# Patient Record
Sex: FEMALE | Race: WHITE | NOT HISPANIC OR LATINO | Employment: FULL TIME | URBAN - METROPOLITAN AREA
[De-identification: names, ages, dates, MRNs, and addresses within clinical notes are randomized per-mention and may not be internally consistent; named-entity substitution may affect disease eponyms.]

---

## 2023-01-30 PROBLEM — Z94.9 TRANSPLANT: Status: ACTIVE | Noted: 2023-01-30

## 2023-01-30 PROBLEM — R94.31 ABNORMAL EKG: Status: ACTIVE | Noted: 2023-01-30

## 2023-01-30 RX ORDER — ESTRADIOL 0.05 MG/D
FILM, EXTENDED RELEASE TRANSDERMAL
COMMUNITY
End: 2023-03-09

## 2023-02-07 PROBLEM — Z52.4 DONOR OF KIDNEY FOR TRANSPLANT: Status: ACTIVE | Noted: 2023-02-07

## 2023-02-23 LAB
APPEARANCE, URINE: CLEAR
BACTERIA, URINE: ABNORMAL /HPF
BILIRUBIN, URINE: NEGATIVE
BLOOD, URINE: NEGATIVE
COLOR, URINE: YELLOW
CREATININE (MG/DL) IN SER/PLAS: 1.16 MG/DL (ref 0.5–1.05)
CREATININE (MG/DL) IN URINE: 107 MG/DL (ref 20–320)
GFR FEMALE: 56 ML/MIN/1.73M2
GLUCOSE, URINE: NEGATIVE MG/DL
KETONES, URINE: NEGATIVE MG/DL
LEUKOCYTE ESTERASE, URINE: ABNORMAL
NITRITE, URINE: NEGATIVE
PH, URINE: 6 (ref 5–8)
PROTEIN (MG/DL) IN URINE: 10 MG/DL (ref 5–24)
PROTEIN, URINE: NEGATIVE MG/DL
PROTEIN/CREATININE (MG/MG) IN URINE: 0.09 MG/MG CREAT (ref 0–0.17)
RBC, URINE: <1 /HPF (ref 0–5)
SPECIFIC GRAVITY, URINE: 1.01 (ref 1–1.03)
SQUAMOUS EPITHELIAL CELLS, URINE: 1 /HPF
UROBILINOGEN, URINE: <2 MG/DL (ref 0–1.9)
WBC, URINE: <1 /HPF (ref 0–5)

## 2023-03-09 ENCOUNTER — TELEPHONE (OUTPATIENT)
Dept: PRIMARY CARE | Facility: CLINIC | Age: 53
End: 2023-03-09

## 2023-03-09 ENCOUNTER — OFFICE VISIT (OUTPATIENT)
Dept: PRIMARY CARE | Facility: CLINIC | Age: 53
End: 2023-03-09
Payer: COMMERCIAL

## 2023-03-09 VITALS
SYSTOLIC BLOOD PRESSURE: 104 MMHG | WEIGHT: 139 LBS | TEMPERATURE: 97 F | OXYGEN SATURATION: 97 % | HEIGHT: 68 IN | RESPIRATION RATE: 16 BRPM | DIASTOLIC BLOOD PRESSURE: 70 MMHG | BODY MASS INDEX: 21.07 KG/M2 | HEART RATE: 73 BPM

## 2023-03-09 DIAGNOSIS — Z00.00 HEALTHCARE MAINTENANCE: Primary | ICD-10-CM

## 2023-03-09 DIAGNOSIS — N39.3 STRESS INCONTINENCE: ICD-10-CM

## 2023-03-09 DIAGNOSIS — Z12.11 COLON CANCER SCREENING: ICD-10-CM

## 2023-03-09 DIAGNOSIS — G47.09 OTHER INSOMNIA: ICD-10-CM

## 2023-03-09 DIAGNOSIS — Z13.220 LIPID SCREENING: ICD-10-CM

## 2023-03-09 DIAGNOSIS — Z78.0 MENOPAUSE: ICD-10-CM

## 2023-03-09 PROBLEM — Z15.02 BIALLELIC MUTATION OF RAD51C GENE: Status: ACTIVE | Noted: 2023-03-09

## 2023-03-09 PROBLEM — Z15.01 BIALLELIC MUTATION OF RAD51C GENE: Status: ACTIVE | Noted: 2023-03-09

## 2023-03-09 PROBLEM — Z15.89 BIALLELIC MUTATION OF RAD51C GENE: Status: ACTIVE | Noted: 2023-03-09

## 2023-03-09 PROBLEM — R92.8 ABNORMAL MAMMOGRAM OF RIGHT BREAST: Status: ACTIVE | Noted: 2023-03-09

## 2023-03-09 PROCEDURE — 99214 OFFICE O/P EST MOD 30 MIN: CPT | Performed by: FAMILY MEDICINE

## 2023-03-09 PROCEDURE — 99396 PREV VISIT EST AGE 40-64: CPT | Performed by: FAMILY MEDICINE

## 2023-03-09 PROCEDURE — 1036F TOBACCO NON-USER: CPT | Performed by: FAMILY MEDICINE

## 2023-03-09 RX ORDER — ESTRADIOL 10 UG/1
10 INSERT VAGINAL 2 TIMES WEEKLY
COMMUNITY
Start: 2023-03-06

## 2023-03-09 RX ORDER — METHYLPREDNISOLONE 4 MG/1
TABLET ORAL
COMMUNITY
Start: 2022-04-06

## 2023-03-09 RX ORDER — CLOBETASOL PROPIONATE 0.46 MG/ML
SOLUTION TOPICAL
COMMUNITY
Start: 2023-02-02

## 2023-03-09 RX ORDER — FLUCONAZOLE 150 MG/1
TABLET ORAL
COMMUNITY
Start: 2023-03-05

## 2023-03-09 RX ORDER — TRAZODONE HYDROCHLORIDE 50 MG/1
50 TABLET ORAL NIGHTLY PRN
Qty: 30 TABLET | Refills: 2 | Status: SHIPPED | OUTPATIENT
Start: 2023-03-09 | End: 2023-08-01 | Stop reason: SDUPTHER

## 2023-03-09 ASSESSMENT — ENCOUNTER SYMPTOMS
NAUSEA: 0
SHORTNESS OF BREATH: 0
ADENOPATHY: 0
ABDOMINAL PAIN: 0
HEMATURIA: 0
WHEEZING: 0
POLYDIPSIA: 0
CHEST TIGHTNESS: 0
PALPITATIONS: 0
LIGHT-HEADEDNESS: 0
CHILLS: 0
DIAPHORESIS: 0
POLYPHAGIA: 0
COUGH: 0
VOMITING: 0
FREQUENCY: 0
NUMBNESS: 0
SORE THROAT: 0
NERVOUS/ANXIOUS: 0
HEADACHES: 0
DIZZINESS: 0
FEVER: 0
DYSURIA: 0
SINUS PAIN: 0
CONFUSION: 0
SINUS PRESSURE: 0
DYSPHORIC MOOD: 0
DIARRHEA: 0
UNEXPECTED WEIGHT CHANGE: 0
CONSTIPATION: 0

## 2023-03-09 ASSESSMENT — PATIENT HEALTH QUESTIONNAIRE - PHQ9
1. LITTLE INTEREST OR PLEASURE IN DOING THINGS: NOT AT ALL
SUM OF ALL RESPONSES TO PHQ9 QUESTIONS 1 AND 2: 0
2. FEELING DOWN, DEPRESSED OR HOPELESS: NOT AT ALL

## 2023-03-09 NOTE — TELEPHONE ENCOUNTER
Left message for pt to let us know what [facility] location she used for her mammogram, need to request results.

## 2023-03-09 NOTE — TELEPHONE ENCOUNTER
----- Message from Jaqueline Frye MD sent at 3/9/2023 12:54 PM EST -----  Please request mammogram result from [facility]

## 2023-03-09 NOTE — ASSESSMENT & PLAN NOTE
- following with [facility] [place]  - negative breast biopsy 12/2022  - going for breast MRI today

## 2023-03-09 NOTE — PROGRESS NOTES
Subjective   Patient ID: Yaron Walker is a 53 y.o. female who presents for well exam.    Had routine screening mammogram 9/15/22 through [facility]. Showed abnormality in the right breast. Had diagnostic mammogram Recommended 6 month follow up. She was not comfortable with that so went to [facility] [place]. They did breast biopsy. Was negative for malignancy. Recommended 6 month follow up mammogram. She is also getting mammograms through there.     Patient is wondering about estrogen therapy. She has been on it since she had her hysterectomy in 2016. Was recently at the breast [place] and they told her to stop it because of increased risk of breast cancer. She has had increased hot flashes since stopping the estrogen. She also is interested in getting the benefits estrogen provides --> decreased risk of osteoporosis, decreased risk of heart disease.     Has been having issues with sleep. Difficult falling asleep. Difficult staying asleep. Has tried melatonin but that did not help. Was on lunesta in the past and that did help.     Concerned about urinary leakage. If she exercises in the morning has no issues but if she goes in the evening, she will have issues. Sometimes just tripping will cause leakage. Has leakage with coughing and sneezing. She has done some pelvic floor training without much help.        Well Adult Physical   Patient here for a comprehensive physical exam.The patient reports  as documented above    Yaron reports her health as Good.    Does the patient take any herbs or supplements that were not prescribed by a doctor? yes   Is the patiet taking calcium supplements? no   Is the patient taking aspirin daily? 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    Menopause 03/09/2023     Resolved Ambulatory Problems     Diagnosis Date Noted    No Resolved Ambulatory Problems     No Additional Past Medical History       Past Surgical History:   Procedure Laterality Date    CT ABDOMEN PELVIS ANGIOGRAM W AND/OR WO IV CONTRAST  09/30/2021    CT ABDOMEN PELVIS ANGIOGRAM W AND/OR WO IV CONTRAST 9/30/2021 [facility]    HYSTERECTOMY      OTHER SURGICAL HISTORY  02/09/2022    Donor kidney transplantation    OTHER SURGICAL HISTORY  10/04/2021    Tumor excision       Outpatient Medications Prior to Visit   Medication Sig Dispense Refill    clobetasol (Temovate) 0.05 % external solution APPLY TOPICALLY TO SCALP EVERY DAY AT BEDTIME.      estradiol (Vagifem) 10 mcg tablet vaginal tablet Insert 1 tablet (10 mcg) into the vagina 2 times a week.      fluconazole (Diflucan) 150 mg tablet TAKE 1 TABLET BY MOUTH TODAY  REPEAT IN 3 DAYS      L. acidophilus/Bifid. animalis 15.5 billion cell capsule Take by mouth.      MAGNESIUM GLYCINATE ORAL Take by mouth.      methylPREDNISolone (Medrol Dospak) 4 mg tablets Take by mouth as directed following package instructions      estradiol (Vivelle-DOT) 0.05 mg/24 hr patch Place on the skin. Twice weekly.       No facility-administered medications prior to visit.       Social History     Socioeconomic History    Marital status:      Spouse name: Rajendra    Number of children: 2    Years of education: None    Highest education level: None   Occupational History    Occupation:    Tobacco Use    Smoking status: Never    Smokeless tobacco: Never   Vaping Use    Vaping status: Never Used   Substance and Sexual Activity    Alcohol use: Yes     Comment: OCCASIONALLY    Drug use: Never    Sexual activity: Yes     Partners: Male     Birth control/protection: Post-menopausal   Other Topics Concern    None   Social History Narrative    None     Social Determinants of Health     Financial Resource  "Strain: Not on file   Food Insecurity: Not on file   Transportation Needs: Not on file   Physical Activity: Not on file   Stress: Not on file   Social Connections: Not on file   Intimate Partner Violence: Not on file   Housing Stability: Not on file        History:  LMP: s/p hysterectomy   Menopause after hysterectomy   : 3  Para: 2  Social History     Substance and Sexual Activity   Sexual Activity Yes    Partners: Male    Birth control/protection: Post-menopausal        Immunization History   Administered Date(s) Administered    Hep B, adult 2004, 2004, 11/15/2004    Influenza, injectable, quadrivalent, preservative free 10/11/2017    Pfizer Purple Cap SARS-CoV-2 2021, 2021, 10/10/2021    Td (adult), unspecified 2001       Last Dental Exam: goes every 6 month    Last Eye Exam: within past year    Diet: in general, a \"healthy\" diet      Exercise: several times per week    Health Maintenance   Topic    Colorectal Cancer Screening     MMR Vaccines (1 of 1 - Standard series)    Cervical Cancer Screening     DTaP/Tdap/Td Vaccines (2 - Tdap)    Mammogram     Zoster Vaccines (1 of 2)    Influenza Vaccine (1)    Yearly Adult Physical     Lipid Panel     Hepatitis B Vaccines     HIV Screening     Hepatitis C Screening     COVID-19 Vaccine     HIB Vaccines     IPV Vaccines     Hepatitis A Vaccines     Meningococcal Vaccine     Rotavirus Vaccines     HPV Vaccines     Pneumococcal Vaccine: Pediatrics (0 to 5 Years) and At-Risk Patients (6 to 64 Years)         Review of Systems   Constitutional:  Negative for chills, diaphoresis, fever and unexpected weight change.   HENT:  Negative for congestion, sinus pressure, sinus pain, sneezing and sore throat.    Respiratory:  Negative for cough, chest tightness, shortness of breath and wheezing.    Cardiovascular:  Negative for chest pain, palpitations and leg swelling.   Gastrointestinal:  Negative for abdominal pain, constipation, diarrhea, " "nausea and vomiting.   Endocrine: Negative for cold intolerance, heat intolerance, polydipsia, polyphagia and polyuria.   Genitourinary:  Negative for dysuria, frequency, hematuria and urgency.   Neurological:  Negative for dizziness, syncope, light-headedness, numbness and headaches.   Hematological:  Negative for adenopathy.   Psychiatric/Behavioral:  Negative for confusion and dysphoric mood. The patient is not nervous/anxious.        Objective   /70 (BP Location: Right arm, Patient Position: Sitting)   Pulse 73   Temp 36.1 °C (97 °F) (Temporal)   Resp 16   Ht 1.727 m (5' 8\")   Wt 63 kg (139 lb)   SpO2 97%   BMI 21.13 kg/m²     Physical Exam  Vitals and nursing note reviewed.   Constitutional:       General: She is not in acute distress.     Appearance: Normal appearance.   HENT:      Head: Normocephalic and atraumatic.      Nose: Nose normal.   Eyes:      Extraocular Movements: Extraocular movements intact.      Conjunctiva/sclera: Conjunctivae normal.      Pupils: Pupils are equal, round, and reactive to light.   Cardiovascular:      Rate and Rhythm: Normal rate and regular rhythm.      Heart sounds: No murmur heard.     No friction rub. No gallop.   Pulmonary:      Effort: Pulmonary effort is normal.      Breath sounds: Normal breath sounds. No wheezing, rhonchi or rales.   Abdominal:      General: Bowel sounds are normal. There is no distension.      Palpations: Abdomen is soft.      Tenderness: There is no abdominal tenderness.   Musculoskeletal:         General: Normal range of motion.      Cervical back: Normal range of motion and neck supple.   Skin:     General: Skin is warm and dry.   Neurological:      General: No focal deficit present.      Mental Status: She is alert and oriented to person, place, and time.      Deep Tendon Reflexes: Reflexes normal.   Psychiatric:         Mood and Affect: Mood normal.         Behavior: Behavior normal.         Thought Content: Thought content normal.    "      Judgment: Judgment normal.         Assessment/Plan   Problem List Items Addressed This Visit       Menopause     - currently off estrogen patch through the advice of the breast center  - will be trying vaginal estrogen          Relevant Orders    CBC and Auto Differential    Comprehensive Metabolic Panel    TSH with reflex to Free T4 if abnormal    Estrogens, Total    FSH    Luteinizing hormone    Follow Up In Primary Care    Other insomnia     - trial trazodone as needed         Relevant Medications    traZODone (Desyrel) 50 mg tablet    Other Relevant Orders    CBC and Auto Differential    Comprehensive Metabolic Panel    TSH with reflex to Free T4 if abnormal    Follow Up In Primary Care    Stress incontinence     - refer to urogynecology          Relevant Orders    Referral to Urogynecology    CBC and Auto Differential    Comprehensive Metabolic Panel    TSH with reflex to Free T4 if abnormal    Follow Up In Primary Care     Other Visit Diagnoses       Healthcare maintenance    -  Primary    Relevant Orders    CBC and Auto Differential    Comprehensive Metabolic Panel    TSH with reflex to Free T4 if abnormal    Follow Up In Primary Care    Colon cancer screening        Relevant Orders    Cologuard® colon cancer screening    Follow Up In Primary Care    Lipid screening        Relevant Orders    Lipid Panel    Follow Up In Primary Care

## 2023-03-09 NOTE — PATIENT INSTRUCTIONS
Yaron Walker ,    Thank you for coming in today. We at [facility] appreciate your trust in our care. If you have any questions or concerns about the care you received today, please do not hesitate to contact us at [phone].    The following instructions were discussed today:    - get labs  - For blood work: Nothing to eat or drink for at least 10 hours prior. Okay for water or black coffee.   - Follow up in 1 year.

## 2023-03-09 NOTE — ASSESSMENT & PLAN NOTE
- currently off estrogen patch through the advice of the breast center  - will be trying vaginal estrogen

## 2023-04-25 LAB
BACTERIA, URINE: ABNORMAL /HPF
RBC, URINE: <1 /HPF (ref 0–5)
WBC, URINE: <1 /HPF (ref 0–5)

## 2023-05-19 ENCOUNTER — HOSPITAL ENCOUNTER (OUTPATIENT)
Dept: DATA CONVERSION | Facility: HOSPITAL | Age: 53
End: 2023-05-19
Attending: STUDENT IN AN ORGANIZED HEALTH CARE EDUCATION/TRAINING PROGRAM | Admitting: STUDENT IN AN ORGANIZED HEALTH CARE EDUCATION/TRAINING PROGRAM
Payer: COMMERCIAL

## 2023-05-19 DIAGNOSIS — Z79.52 LONG TERM (CURRENT) USE OF SYSTEMIC STEROIDS: ICD-10-CM

## 2023-05-19 DIAGNOSIS — N39.3 STRESS INCONTINENCE (FEMALE) (MALE): ICD-10-CM

## 2023-05-19 DIAGNOSIS — I25.2 OLD MYOCARDIAL INFARCTION: ICD-10-CM

## 2023-05-19 LAB
ANION GAP IN SER/PLAS: 13 MMOL/L (ref 10–20)
ATRIAL RATE: 72 BPM
CALCIUM (MG/DL) IN SER/PLAS: 9 MG/DL (ref 8.6–10.3)
CARBON DIOXIDE, TOTAL (MMOL/L) IN SER/PLAS: 23 MMOL/L (ref 21–32)
CHLORIDE (MMOL/L) IN SER/PLAS: 106 MMOL/L (ref 98–107)
CREATININE (MG/DL) IN SER/PLAS: 1.01 MG/DL (ref 0.5–1.05)
ERYTHROCYTE DISTRIBUTION WIDTH (RATIO) BY AUTOMATED COUNT: 12.6 % (ref 11.5–14.5)
ERYTHROCYTE MEAN CORPUSCULAR HEMOGLOBIN CONCENTRATION (G/DL) BY AUTOMATED: 33.5 G/DL (ref 32–36)
ERYTHROCYTE MEAN CORPUSCULAR VOLUME (FL) BY AUTOMATED COUNT: 87 FL (ref 80–100)
ERYTHROCYTES (10*6/UL) IN BLOOD BY AUTOMATED COUNT: 4.92 X10E12/L (ref 4–5.2)
GFR FEMALE: 66 ML/MIN/1.73M2
GLUCOSE (MG/DL) IN SER/PLAS: 77 MG/DL (ref 74–99)
HEMATOCRIT (%) IN BLOOD BY AUTOMATED COUNT: 43 % (ref 36–46)
HEMOGLOBIN (G/DL) IN BLOOD: 14.4 G/DL (ref 12–16)
LEUKOCYTES (10*3/UL) IN BLOOD BY AUTOMATED COUNT: 4.4 X10E9/L (ref 4.4–11.3)
P AXIS: 56 DEGREES
PLATELETS (10*3/UL) IN BLOOD AUTOMATED COUNT: 167 X10E9/L (ref 150–450)
POTASSIUM (MMOL/L) IN SER/PLAS: 4.1 MMOL/L (ref 3.5–5.3)
PR INTERVAL: 144 MS
Q ONSET: 249 MS
QRS COUNT: 11 BEATS
QRS DURATION: 94 MS
QT INTERVAL: 365 MS
QTC CALCULATION(BAZETT): 400 MS
QTC FREDERICIA: 388 MS
R AXIS: 60 DEGREES
SODIUM (MMOL/L) IN SER/PLAS: 138 MMOL/L (ref 136–145)
T AXIS: 68 DEGREES
T OFFSET: 432 MS
UREA NITROGEN (MG/DL) IN SER/PLAS: 15 MG/DL (ref 6–23)
VENTRICULAR RATE: 72 BPM

## 2023-07-10 ENCOUNTER — TELEPHONE (OUTPATIENT)
Dept: PRIMARY CARE | Facility: CLINIC | Age: 53
End: 2023-07-10
Payer: COMMERCIAL

## 2023-07-10 LAB
ALANINE AMINOTRANSFERASE (SGPT) (U/L) IN SER/PLAS: 14 U/L (ref 7–45)
ALBUMIN (G/DL) IN SER/PLAS: 4.3 G/DL (ref 3.4–5)
ALKALINE PHOSPHATASE (U/L) IN SER/PLAS: 79 U/L (ref 33–110)
ANION GAP IN SER/PLAS: 12 MMOL/L (ref 10–20)
ASPARTATE AMINOTRANSFERASE (SGOT) (U/L) IN SER/PLAS: 23 U/L (ref 9–39)
BASOPHILS (10*3/UL) IN BLOOD BY AUTOMATED COUNT: 0.02 X10E9/L (ref 0–0.1)
BASOPHILS/100 LEUKOCYTES IN BLOOD BY AUTOMATED COUNT: 0.5 % (ref 0–2)
BILIRUBIN TOTAL (MG/DL) IN SER/PLAS: 0.8 MG/DL (ref 0–1.2)
CALCIUM (MG/DL) IN SER/PLAS: 9.3 MG/DL (ref 8.6–10.3)
CARBON DIOXIDE, TOTAL (MMOL/L) IN SER/PLAS: 26 MMOL/L (ref 21–32)
CHLORIDE (MMOL/L) IN SER/PLAS: 105 MMOL/L (ref 98–107)
CHOLESTEROL (MG/DL) IN SER/PLAS: 226 MG/DL (ref 0–199)
CHOLESTEROL IN HDL (MG/DL) IN SER/PLAS: 88.7 MG/DL
CHOLESTEROL/HDL RATIO: 2.5
CREATININE (MG/DL) IN SER/PLAS: 1.04 MG/DL (ref 0.5–1.05)
EOSINOPHILS (10*3/UL) IN BLOOD BY AUTOMATED COUNT: 0.08 X10E9/L (ref 0–0.7)
EOSINOPHILS/100 LEUKOCYTES IN BLOOD BY AUTOMATED COUNT: 2.1 % (ref 0–6)
ERYTHROCYTE DISTRIBUTION WIDTH (RATIO) BY AUTOMATED COUNT: 12.1 % (ref 11.5–14.5)
ERYTHROCYTE MEAN CORPUSCULAR HEMOGLOBIN CONCENTRATION (G/DL) BY AUTOMATED: 33.5 G/DL (ref 32–36)
ERYTHROCYTE MEAN CORPUSCULAR VOLUME (FL) BY AUTOMATED COUNT: 90 FL (ref 80–100)
ERYTHROCYTES (10*6/UL) IN BLOOD BY AUTOMATED COUNT: 4.83 X10E12/L (ref 4–5.2)
FOLLITROPIN (IU/L) IN SER/PLAS: 130.2 IU/L
GFR FEMALE: 64 ML/MIN/1.73M2
GLUCOSE (MG/DL) IN SER/PLAS: 74 MG/DL (ref 74–99)
HEMATOCRIT (%) IN BLOOD BY AUTOMATED COUNT: 43.3 % (ref 36–46)
HEMOGLOBIN (G/DL) IN BLOOD: 14.5 G/DL (ref 12–16)
IMMATURE GRANULOCYTES/100 LEUKOCYTES IN BLOOD BY AUTOMATED COUNT: 0 % (ref 0–0.9)
LDL: 123 MG/DL (ref 0–99)
LEUKOCYTES (10*3/UL) IN BLOOD BY AUTOMATED COUNT: 3.8 X10E9/L (ref 4.4–11.3)
LUTEINIZING HORMONE (IU/ML) IN SER/PLAS: 41.3 IU/L
LYMPHOCYTES (10*3/UL) IN BLOOD BY AUTOMATED COUNT: 1.22 X10E9/L (ref 1.2–4.8)
LYMPHOCYTES/100 LEUKOCYTES IN BLOOD BY AUTOMATED COUNT: 32.2 % (ref 13–44)
MONOCYTES (10*3/UL) IN BLOOD BY AUTOMATED COUNT: 0.38 X10E9/L (ref 0.1–1)
MONOCYTES/100 LEUKOCYTES IN BLOOD BY AUTOMATED COUNT: 10 % (ref 2–10)
NEUTROPHILS (10*3/UL) IN BLOOD BY AUTOMATED COUNT: 2.09 X10E9/L (ref 1.2–7.7)
NEUTROPHILS/100 LEUKOCYTES IN BLOOD BY AUTOMATED COUNT: 55.2 % (ref 40–80)
PLATELETS (10*3/UL) IN BLOOD AUTOMATED COUNT: 193 X10E9/L (ref 150–450)
POTASSIUM (MMOL/L) IN SER/PLAS: 3.9 MMOL/L (ref 3.5–5.3)
PROTEIN TOTAL: 7.3 G/DL (ref 6.4–8.2)
SODIUM (MMOL/L) IN SER/PLAS: 139 MMOL/L (ref 136–145)
THYROTROPIN (MIU/L) IN SER/PLAS BY DETECTION LIMIT <= 0.05 MIU/L: 1.42 MIU/L (ref 0.44–3.98)
TRIGLYCERIDE (MG/DL) IN SER/PLAS: 70 MG/DL (ref 0–149)
UREA NITROGEN (MG/DL) IN SER/PLAS: 15 MG/DL (ref 6–23)
VLDL: 14 MG/DL (ref 0–40)

## 2023-07-10 NOTE — TELEPHONE ENCOUNTER
Pt has had tingling in her feet for the last week. Not sure what is going on. No other symptoms but would like you to check it out.  No openings on your schedule this week, recommendations..    Called pt to schedule and she said symptoms have improved so will be holding off on appt for now.

## 2023-07-14 LAB — ESTROGEN,TOTAL: 33 PG/ML

## 2023-08-01 DIAGNOSIS — G47.09 OTHER INSOMNIA: ICD-10-CM

## 2023-08-01 RX ORDER — TRAZODONE HYDROCHLORIDE 50 MG/1
50 TABLET ORAL NIGHTLY PRN
Qty: 90 TABLET | Refills: 1 | Status: SHIPPED | OUTPATIENT
Start: 2023-08-01 | End: 2024-01-18

## 2023-09-07 VITALS — WEIGHT: 141.09 LBS | HEIGHT: 68 IN | BODY MASS INDEX: 21.38 KG/M2

## 2023-09-30 NOTE — H&P
History & Physical Reviewed:   Pregnant/Lactating:  ·  Are You Pregnant no   ·  Are You Currently Breastfeeding no     I have reviewed the History and Physical dated:  19-May-2023   History and Physical reviewed and relevant findings noted. Patient examined to review pertinent physical  findings.: No significant changes   Home Medications Reviewed: no changes noted   Allergies Reviewed: no changes noted       ERAS (Enhanced Recovery After Surgery):  ·  ERAS Patient: no     Consent:   COVID-19 Consent:  ·  COVID-19 Risk Consent Surgeon has reviewed key risks related to the risk of jozef COVID-19 and if they contract COVID-19 what the risks are.       Electronic Signatures:  Yossi Hernández)  (Signed 19-May-2023 15:32)   Authored: History & Physical Reviewed, ERAS, Consent,  Note Completion      Last Updated: 19-May-2023 15:32 by Yossi Hernández)

## 2023-10-02 NOTE — OP NOTE
Post Operative Note:     PreOp Diagnosis: shy   Post-Procedure Diagnosis: same   Procedure: 1. urethral bulking   Surgeon: preston   Resident/Fellow/Other Assistant: none   Estimated Blood Loss (mL): none   Specimen: no   Findings: normal bladder     Operative Report Dictated:  Dictation: not applicable - note contains Operative  Report   Operative Report:    After the patient was placed under general anesthesia she was prepped and draped in the usual sterile fashion.     At the start of the procedure  to the end rotatable Bulkamid sheath and the water inflow was adjusted to produce an adequate stream. The Bulkamid Needle was then placed ¾ of the way into the needle channel of the rotatable sheath and the entire Bulkamid  system was then advanced into the urethra until the bladder is visualised and inspected. The Bulkamid needle was then advanced into the needle channel on the rotatable sheath until the tip of the sheath is adjacent to the bladder neck.    The sheath was then rotated to the 7 o?clock position. The needed was then extend into the bladder until the 2cm rosa on the needle is visible. The Bulkamid system was then retracted until the tip of the needle was resting on the bladder neck.  The needle was then retracted into the sheath and approximately 1.5cm from the bladder neck the Bulkamid system was pressed parallel against the urethral wall and the needle is then advanced into the submucosal tissue ensuring that the bevel of the needle  was facing towards the lumen. The needle was advanced approximately 0.5cm, and the Bulkamid hydrogel was then injected until the Bulkamid cushion was visible and reached the midline of the urethral lumen.    The needle was then retracted back into the rotatable sheath, and rotated to the next injection site. Subsequent injections were preformed at 5 o?clock, 2 o?clock and 10 o?clock all along the same plane as the original injection until  all (4) cushions met at the  midline of the urethral lumen. 2mls Total of Bulkamid Hydrogel was used.    Approximately 500cc of fluid was left in the bladder and upon completion, the patient emptied her bladder without issues. The procedure was well tolerated and EBL was minimal.    Patient was given instruction to contact the office if she had any difficulty urinating and was consulted about the potential of a ?top up? procedure if the desired effect was not achieved. Prophylactic antibiotics were prescribed, and a  follow up appointment was scheduled to evaluate improvement.       Electronic Signatures:  Yossi Hernández)  (Signed 19-May-2023 16:42)   Authored: Post Operative Note, Note Completion      Last Updated: 19-May-2023 16:42 by Yossi Hernández)

## 2023-10-25 ENCOUNTER — TELEPHONE (OUTPATIENT)
Dept: TRANSPLANT | Facility: HOSPITAL | Age: 53
End: 2023-10-25
Payer: COMMERCIAL

## 2023-10-30 ENCOUNTER — APPOINTMENT (OUTPATIENT)
Dept: PRIMARY CARE | Facility: CLINIC | Age: 53
End: 2023-10-30
Payer: COMMERCIAL

## 2023-11-01 ENCOUNTER — APPOINTMENT (OUTPATIENT)
Dept: PRIMARY CARE | Facility: CLINIC | Age: 53
End: 2023-11-01
Payer: COMMERCIAL

## 2023-11-02 ENCOUNTER — APPOINTMENT (OUTPATIENT)
Dept: PRIMARY CARE | Facility: CLINIC | Age: 53
End: 2023-11-02
Payer: COMMERCIAL

## 2023-11-27 ENCOUNTER — PATIENT MESSAGE (OUTPATIENT)
Dept: PRIMARY CARE | Facility: CLINIC | Age: 53
End: 2023-11-27
Payer: COMMERCIAL

## 2023-11-27 DIAGNOSIS — E78.00 ELEVATED LDL CHOLESTEROL LEVEL: Primary | ICD-10-CM

## 2023-11-28 DIAGNOSIS — Z52.4 DONOR OF KIDNEY FOR TRANSPLANT: Primary | ICD-10-CM

## 2023-12-01 ENCOUNTER — LAB (OUTPATIENT)
Dept: LAB | Facility: LAB | Age: 53
End: 2023-12-01
Payer: COMMERCIAL

## 2023-12-01 DIAGNOSIS — Z13.220 LIPID SCREENING: ICD-10-CM

## 2023-12-01 DIAGNOSIS — G47.09 OTHER INSOMNIA: ICD-10-CM

## 2023-12-01 DIAGNOSIS — N39.3 STRESS INCONTINENCE: ICD-10-CM

## 2023-12-01 DIAGNOSIS — E78.00 ELEVATED LDL CHOLESTEROL LEVEL: ICD-10-CM

## 2023-12-01 DIAGNOSIS — Z00.00 HEALTHCARE MAINTENANCE: ICD-10-CM

## 2023-12-01 DIAGNOSIS — Z78.0 MENOPAUSE: ICD-10-CM

## 2023-12-01 LAB
ALBUMIN SERPL BCP-MCNC: 4.2 G/DL (ref 3.4–5)
ALP SERPL-CCNC: 80 U/L (ref 33–110)
ALT SERPL W P-5'-P-CCNC: 15 U/L (ref 7–45)
ANION GAP SERPL CALC-SCNC: 11 MMOL/L (ref 10–20)
AST SERPL W P-5'-P-CCNC: 19 U/L (ref 9–39)
BASOPHILS # BLD AUTO: 0.03 X10*3/UL (ref 0–0.1)
BASOPHILS NFR BLD AUTO: 0.7 %
BILIRUB DIRECT SERPL-MCNC: 0.1 MG/DL (ref 0–0.3)
BILIRUB SERPL-MCNC: 0.5 MG/DL (ref 0–1.2)
BUN SERPL-MCNC: 12 MG/DL (ref 6–23)
CALCIUM SERPL-MCNC: 9 MG/DL (ref 8.6–10.3)
CHLORIDE SERPL-SCNC: 106 MMOL/L (ref 98–107)
CHOLEST SERPL-MCNC: 245 MG/DL (ref 0–199)
CHOLESTEROL/HDL RATIO: 2.2
CO2 SERPL-SCNC: 24 MMOL/L (ref 21–32)
CREAT SERPL-MCNC: 1.01 MG/DL (ref 0.5–1.05)
EOSINOPHIL # BLD AUTO: 0.11 X10*3/UL (ref 0–0.7)
EOSINOPHIL NFR BLD AUTO: 2.6 %
ERYTHROCYTE [DISTWIDTH] IN BLOOD BY AUTOMATED COUNT: 12.8 % (ref 11.5–14.5)
FSH SERPL-ACNC: 112.3 IU/L
GFR SERPL CREATININE-BSD FRML MDRD: 66 ML/MIN/1.73M*2
GLUCOSE SERPL-MCNC: 88 MG/DL (ref 74–99)
HCT VFR BLD AUTO: 43.6 % (ref 36–46)
HDLC SERPL-MCNC: 111.6 MG/DL
HGB BLD-MCNC: 14.6 G/DL (ref 12–16)
IMM GRANULOCYTES # BLD AUTO: 0.01 X10*3/UL (ref 0–0.7)
IMM GRANULOCYTES NFR BLD AUTO: 0.2 % (ref 0–0.9)
LDLC SERPL CALC-MCNC: 123 MG/DL
LH SERPL-ACNC: 26.6 IU/L
LYMPHOCYTES # BLD AUTO: 1.09 X10*3/UL (ref 1.2–4.8)
LYMPHOCYTES NFR BLD AUTO: 25.6 %
MCH RBC QN AUTO: 30.2 PG (ref 26–34)
MCHC RBC AUTO-ENTMCNC: 33.5 G/DL (ref 32–36)
MCV RBC AUTO: 90 FL (ref 80–100)
MONOCYTES # BLD AUTO: 0.43 X10*3/UL (ref 0.1–1)
MONOCYTES NFR BLD AUTO: 10.1 %
NEUTROPHILS # BLD AUTO: 2.58 X10*3/UL (ref 1.2–7.7)
NEUTROPHILS NFR BLD AUTO: 60.8 %
NON HDL CHOLESTEROL: 133 MG/DL (ref 0–149)
NRBC BLD-RTO: 0 /100 WBCS (ref 0–0)
PLATELET # BLD AUTO: 220 X10*3/UL (ref 150–450)
POTASSIUM SERPL-SCNC: 4.2 MMOL/L (ref 3.5–5.3)
PROT SERPL-MCNC: 6.6 G/DL (ref 6.4–8.2)
RBC # BLD AUTO: 4.84 X10*6/UL (ref 4–5.2)
SODIUM SERPL-SCNC: 137 MMOL/L (ref 136–145)
TRIGL SERPL-MCNC: 53 MG/DL (ref 0–149)
TSH SERPL-ACNC: 1.14 MIU/L (ref 0.44–3.98)
VLDL: 11 MG/DL (ref 0–40)
WBC # BLD AUTO: 4.3 X10*3/UL (ref 4.4–11.3)

## 2023-12-01 PROCEDURE — 82248 BILIRUBIN DIRECT: CPT

## 2023-12-01 PROCEDURE — 84443 ASSAY THYROID STIM HORMONE: CPT

## 2023-12-01 PROCEDURE — 83002 ASSAY OF GONADOTROPIN (LH): CPT

## 2023-12-01 PROCEDURE — 80053 COMPREHEN METABOLIC PANEL: CPT

## 2023-12-01 PROCEDURE — 36415 COLL VENOUS BLD VENIPUNCTURE: CPT

## 2023-12-01 PROCEDURE — 82672 ASSAY OF ESTROGEN: CPT

## 2023-12-01 PROCEDURE — 85025 COMPLETE CBC W/AUTO DIFF WBC: CPT

## 2023-12-01 PROCEDURE — 83001 ASSAY OF GONADOTROPIN (FSH): CPT

## 2023-12-01 PROCEDURE — 80061 LIPID PANEL: CPT

## 2023-12-07 LAB — ESTROGEN SERPL-MCNC: 36 PG/ML

## 2023-12-13 ENCOUNTER — DOCUMENTATION (OUTPATIENT)
Dept: TRANSPLANT | Facility: HOSPITAL | Age: 53
End: 2023-12-13

## 2023-12-13 ENCOUNTER — OFFICE VISIT (OUTPATIENT)
Dept: TRANSPLANT | Facility: HOSPITAL | Age: 53
End: 2023-12-13
Payer: COMMERCIAL

## 2023-12-13 ENCOUNTER — LAB (OUTPATIENT)
Dept: LAB | Facility: LAB | Age: 53
End: 2023-12-13
Payer: COMMERCIAL

## 2023-12-13 VITALS
DIASTOLIC BLOOD PRESSURE: 65 MMHG | WEIGHT: 138.8 LBS | HEART RATE: 78 BPM | BODY MASS INDEX: 21.16 KG/M2 | TEMPERATURE: 97.9 F | OXYGEN SATURATION: 97 % | SYSTOLIC BLOOD PRESSURE: 117 MMHG

## 2023-12-13 DIAGNOSIS — Z52.4 DONOR OF KIDNEY FOR TRANSPLANT: ICD-10-CM

## 2023-12-13 DIAGNOSIS — Z52.4 KIDNEY DONOR: Primary | ICD-10-CM

## 2023-12-13 LAB
APPEARANCE UR: CLEAR
BILIRUB UR STRIP.AUTO-MCNC: NEGATIVE MG/DL
COLOR UR: ABNORMAL
CREAT SERPL-MCNC: 0.89 MG/DL (ref 0.5–1.05)
CREAT UR-MCNC: 26.1 MG/DL (ref 20–320)
GFR SERPL CREATININE-BSD FRML MDRD: 77 ML/MIN/1.73M*2
GLUCOSE UR STRIP.AUTO-MCNC: NEGATIVE MG/DL
HOLD SPECIMEN: NORMAL
KETONES UR STRIP.AUTO-MCNC: NEGATIVE MG/DL
LEUKOCYTE ESTERASE UR QL STRIP.AUTO: ABNORMAL
NITRITE UR QL STRIP.AUTO: NEGATIVE
PH UR STRIP.AUTO: 7 [PH]
PROT UR STRIP.AUTO-MCNC: NEGATIVE MG/DL
PROT UR-ACNC: <4 MG/DL (ref 5–24)
PROT/CREAT UR: ABNORMAL MG/G{CREAT}
RBC # UR STRIP.AUTO: NEGATIVE /UL
RBC #/AREA URNS AUTO: NORMAL /HPF
SP GR UR STRIP.AUTO: 1
SQUAMOUS #/AREA URNS AUTO: NORMAL /HPF
UROBILINOGEN UR STRIP.AUTO-MCNC: <2 MG/DL
WBC #/AREA URNS AUTO: NORMAL /HPF

## 2023-12-13 PROCEDURE — 84156 ASSAY OF PROTEIN URINE: CPT

## 2023-12-13 PROCEDURE — 99214 OFFICE O/P EST MOD 30 MIN: CPT

## 2023-12-13 PROCEDURE — 1036F TOBACCO NON-USER: CPT | Performed by: HOSPITALIST

## 2023-12-13 PROCEDURE — 87086 URINE CULTURE/COLONY COUNT: CPT

## 2023-12-13 PROCEDURE — 82570 ASSAY OF URINE CREATININE: CPT

## 2023-12-13 PROCEDURE — 81001 URINALYSIS AUTO W/SCOPE: CPT

## 2023-12-13 PROCEDURE — 82565 ASSAY OF CREATININE: CPT

## 2023-12-13 PROCEDURE — 36415 COLL VENOUS BLD VENIPUNCTURE: CPT

## 2023-12-13 ASSESSMENT — ENCOUNTER SYMPTOMS
CHEST TIGHTNESS: 0
DYSURIA: 0
HEMATURIA: 0
BLOOD IN STOOL: 0
COUGH: 0
POLYDIPSIA: 0
POLYPHAGIA: 0
CARDIOVASCULAR NEGATIVE: 1
PSYCHIATRIC NEGATIVE: 1
HEMATOLOGIC/LYMPHATIC NEGATIVE: 1
NEUROLOGICAL NEGATIVE: 1
CONSTIPATION: 0
FLANK PAIN: 0
RESPIRATORY NEGATIVE: 1
ABDOMINAL DISTENTION: 0
SHORTNESS OF BREATH: 0

## 2023-12-13 ASSESSMENT — PAIN SCALES - GENERAL: PAINLEVEL: 0-NO PAIN

## 2023-12-13 ASSESSMENT — SOCIAL DETERMINANTS OF HEALTH (SDOH): WITHIN THE LAST YEAR, HAVE YOU BEEN AFRAID OF YOUR PARTNER OR EX-PARTNER?: NO

## 2023-12-13 NOTE — PROGRESS NOTES
Ms. Norbert Ramos is a 53-year-old female with a no significant past medical history was here for her post donation follow-up. 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     Subjective: Denied any complaints on today's visit.  No issues with the blood pressure.  No issues with the incision site.    Review of Systems   HENT: Negative.     Respiratory: Negative.  Negative for cough, chest tightness and shortness of breath.    Cardiovascular: Negative.  Negative for leg swelling.   Gastrointestinal:  Negative for abdominal distention, blood in stool and constipation.   Endocrine: Negative for polydipsia, polyphagia and polyuria.   Genitourinary:  Negative for decreased urine volume, dysuria, flank pain, hematuria and urgency.   Neurological: Negative.    Hematological: Negative.    Psychiatric/Behavioral: Negative.          Objective:  Visit Vitals  /65   Pulse 78   Temp 36.6 °C (97.9 °F) (Temporal)   Wt 63 kg (138 lb 12.8 oz)   SpO2 97%   BMI 21.16 kg/m²   Smoking Status Never   BSA 1.74 m²      Physical Exam  HENT:      Head: Normocephalic.   Eyes:      Pupils: Pupils are equal, round, and reactive to light.   Cardiovascular:      Rate and Rhythm: Normal rate and regular rhythm.   Pulmonary:      Effort: Pulmonary effort is normal. No respiratory distress.      Breath sounds: No wheezing or rales.   Abdominal:      General: There is no distension.      Tenderness: There is no abdominal tenderness. There is no rebound.   Musculoskeletal:         General: Normal range of motion.   Skin:     General: Skin is warm.      Findings: No bruising, lesion or rash.   Neurological:      General: No focal deficit present.   Psychiatric:         Mood and Affect: Mood normal.            Current Outpatient Medications:     clobetasol (Temovate) 0.05 % external solution, APPLY TOPICALLY TO SCALP EVERY DAY AT BEDTIME., Disp: , Rfl:     estradiol (Vagifem) 10 mcg tablet  vaginal tablet, Insert 1 tablet (10 mcg) into the vagina 2 times a week., Disp: , Rfl:     fluconazole (Diflucan) 150 mg tablet, TAKE 1 TABLET BY MOUTH TODAY  REPEAT IN 3 DAYS, Disp: , Rfl:     L. acidophilus/Bifid. animalis 15.5 billion cell capsule, Take by mouth., Disp: , Rfl:     MAGNESIUM GLYCINATE ORAL, Take by mouth., Disp: , Rfl:     methylPREDNISolone (Medrol Dospak) 4 mg tablets, Take by mouth as directed following package instructions, Disp: , Rfl:     traZODone (Desyrel) 50 mg tablet, Take 1 tablet (50 mg) by mouth as needed at bedtime for sleep., Disp: 90 tablet, Rfl: 1     [unfilled]     No images are attached to the encounter.     Assessment and Plan: Ms. Norbert Ramos is a 53-year-old female with a no significant past medical history was here for her post donation follow-up. 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     Kidney function:  -Her baseline prior to the donation creatinine is 0.7 -0.8, post donation it is in the range is 1.01-1.04  -Urine analysis is pending at this time  -Blood pressures are optimally controlled.  -No significant surgical site complications or postop complications with reference to surgery.  -Age-appropriate screening and age-appropriate vaccinations are recommended.    Recommended her to follow-up with the PCP as she completed 2-year follow-up in case if she had any concerns please come back to visit us.  Jason Daniels MD

## 2023-12-13 NOTE — PROGRESS NOTES
Yaron came in for her 2 year donor follow-up. She stated that she is functioning at 100% and denied any limitations. Yaron said that she continues to work full-time and denied a loss of insurance. She denied any hospitalizations in the past year and denied any trips to the ER/urgent care. Yaron is following closely with her PCP and denied a new diagnosis of hypertension, diabetes or a problem with her remaining kidney. 2 year labs done today. We will follow up with there any issues that need to be addressed. Creatinine done in early December with her PCP was 1.0. Awaiting urine protein results.

## 2023-12-14 LAB — BACTERIA UR CULT: NO GROWTH

## 2024-01-18 DIAGNOSIS — G47.09 OTHER INSOMNIA: ICD-10-CM

## 2024-01-18 RX ORDER — TRAZODONE HYDROCHLORIDE 50 MG/1
50 TABLET ORAL NIGHTLY PRN
Qty: 90 TABLET | Refills: 3 | Status: SHIPPED | OUTPATIENT
Start: 2024-01-18

## 2024-01-24 LAB — NONINV COLON CA DNA+OCC BLD SCRN STL QL: NEGATIVE

## 2024-02-07 ENCOUNTER — TELEPHONE (OUTPATIENT)
Dept: PRIMARY CARE | Facility: CLINIC | Age: 54
End: 2024-02-07
Payer: COMMERCIAL

## 2024-03-07 ENCOUNTER — APPOINTMENT (OUTPATIENT)
Dept: PRIMARY CARE | Facility: CLINIC | Age: 54
End: 2024-03-07
Payer: COMMERCIAL

## 2024-03-18 ENCOUNTER — TELEMEDICINE (OUTPATIENT)
Dept: PRIMARY CARE | Facility: CLINIC | Age: 54
End: 2024-03-18
Payer: COMMERCIAL

## 2024-03-18 DIAGNOSIS — G47.09 OTHER INSOMNIA: ICD-10-CM

## 2024-03-18 DIAGNOSIS — E78.2 MIXED HYPERLIPIDEMIA: ICD-10-CM

## 2024-03-18 DIAGNOSIS — Z12.11 COLON CANCER SCREENING: ICD-10-CM

## 2024-03-18 DIAGNOSIS — Z00.00 HEALTHCARE MAINTENANCE: ICD-10-CM

## 2024-03-18 DIAGNOSIS — Z78.0 MENOPAUSE: ICD-10-CM

## 2024-03-18 DIAGNOSIS — Z13.6 SCREENING FOR ISCHEMIC HEART DISEASE: ICD-10-CM

## 2024-03-18 DIAGNOSIS — Z00.00 WELL ADULT EXAM: Primary | ICD-10-CM

## 2024-03-18 DIAGNOSIS — N39.3 STRESS INCONTINENCE: ICD-10-CM

## 2024-03-18 PROCEDURE — 1036F TOBACCO NON-USER: CPT | Performed by: FAMILY MEDICINE

## 2024-03-18 PROCEDURE — 99396 PREV VISIT EST AGE 40-64: CPT | Performed by: FAMILY MEDICINE

## 2024-03-18 NOTE — PROGRESS NOTES
Subjective   Patient ID: Yaron Walker is a 54 y.o. female who presents for Annual Exam.    Virtual or Telephone Consent    An interactive audio and video telecommunication system which permits real time communications between the patient (at the originating site) and provider (at the distant site) was utilized to provide this telehealth service.   Verbal consent was requested and obtained from Yaron Walker on this date, 03/18/24 for a telehealth visit.      Well Adult Physical   Patient here for a comprehensive physical exam.The patient reports no problems    Yaron reports her health as Excellent.    Does the patient take any herbs or supplements that were not prescribed by a doctor? yes   Is the patiet taking calcium supplements? no   Is the patient taking aspirin daily? no    Outpatient Medications Prior to Visit   Medication Sig Dispense Refill    clobetasol (Temovate) 0.05 % external solution APPLY TOPICALLY TO SCALP EVERY DAY AT BEDTIME.      estradiol (Vagifem) 10 mcg tablet vaginal tablet Insert 1 tablet (10 mcg) into the vagina 2 times a week.      fluconazole (Diflucan) 150 mg tablet TAKE 1 TABLET BY MOUTH TODAY  REPEAT IN 3 DAYS      L. acidophilus/Bifid. animalis 15.5 billion cell capsule Take by mouth.      MAGNESIUM GLYCINATE ORAL Take by mouth.      methylPREDNISolone (Medrol Dospak) 4 mg tablets Take by mouth as directed following package instructions      traZODone (Desyrel) 50 mg tablet TAKE 1 TABLET AT BEDTIME AS NEEDED FOR SLEEP 90 tablet 3     No facility-administered medications prior to visit.       Social History     Socioeconomic History    Marital status:      Spouse name: Rajendra    Number of children: 2    Years of education: None    Highest education level: None   Occupational History    Occupation:    Tobacco Use    Smoking status: Never    Smokeless tobacco: Never   Vaping Use    Vaping Use: Never used   Substance and Sexual Activity    Alcohol use: Yes      "Comment: OCCASIONALLY    Drug use: Never    Sexual activity: Yes     Partners: Male     Birth control/protection: Post-menopausal   Other Topics Concern    None   Social History Narrative    None     Social Determinants of Health     Financial Resource Strain: Not on file   Food Insecurity: Not on file   Transportation Needs: Not on file   Physical Activity: Not on file   Stress: Not on file   Social Connections: Not on file   Intimate Partner Violence: Unknown (2023)    Humiliation, Afraid, Rape, and Kick questionnaire     Fear of Current or Ex-Partner: No     Emotionally Abused: Not on file     Physically Abused: Not on file     Sexually Abused: Not on file   Housing Stability: Not on file        History:  LMP: 2016  Menopause at 46 years  Last pap date: 2016  Abnormal pap? no  : 3  Para: 2    E-cigarette/Vaping       Questions Responses    E-cigarette/Vaping Use Never User            Last Dental Exam: 6 months or less    Last Eye Exam: more than 6 months    Diet: in general, a \"healthy\" diet      Exercise: daily    Health Maintenance Due   Topic Date Due    MMR Vaccines (1 of 1 - Standard series) Never done    Pneumococcal Vaccine: Pediatrics (0 to 5 Years) and At-Risk Patients (6 to 64 Years) (1 - PCV) Never done    Cervical Cancer Screening  Never done    DTaP/Tdap/Td Vaccines (1 - Tdap) 2001    Zoster Vaccines (1 of 2) Never done    Influenza Vaccine (1) 2023    COVID-19 Vaccine (4 - - season) 2023    Yearly Adult Physical  03/10/2024            Review of Systems      Objective   There were no vitals taken for this visit.          Assessment/Plan   Problem List Items Addressed This Visit             ICD-10-CM    Menopause Z78.0    Mixed hyperlipidemia E78.2     -  but   - 10 year CV risk 1.1%  - patient is concerned. Will check CT cardiac score  - check lipoprotein NMR         Relevant Orders    Lipoprotein NMR    Zinc, Serum or Plasma    Vitamin B12    " Ferritin    Folate    Iron and TIBC    Prealbumin    Selenium, Blood    Vitamin B1, Whole Blood    Copper, Blood    Vitamin D 25-Hydroxy,Total (for eval of Vitamin D levels)    Other insomnia G47.09     - controlled. Continue trazodone as needed          Relevant Orders    Zinc, Serum or Plasma    Vitamin B12    Ferritin    Folate    Iron and TIBC    Prealbumin    Selenium, Blood    Vitamin B1, Whole Blood    Copper, Blood    Vitamin D 25-Hydroxy,Total (for eval of Vitamin D levels)    Stress incontinence N39.3     Other Visit Diagnoses         Codes    Well adult exam    -  Primary Z00.00    Relevant Orders    Zinc, Serum or Plasma    Vitamin B12    Ferritin    Folate    Iron and TIBC    Prealbumin    Selenium, Blood    Vitamin B1, Whole Blood    Copper, Blood    Vitamin D 25-Hydroxy,Total (for eval of Vitamin D levels)    Colon cancer screening     Z12.11    Healthcare maintenance     Z00.00    Screening for ischemic heart disease     Z13.6    Relevant Orders    CT cardiac scoring wo IV contrast    Zinc, Serum or Plasma    Vitamin B12    Ferritin    Folate    Iron and TIBC    Prealbumin    Selenium, Blood    Vitamin B1, Whole Blood    Copper, Blood    Vitamin D 25-Hydroxy,Total (for eval of Vitamin D levels)                sitting

## 2024-03-18 NOTE — ASSESSMENT & PLAN NOTE
-  but   - 10 year CV risk 1.1%  - patient is concerned. Will check CT cardiac score  - check lipoprotein NMR

## 2024-04-08 ENCOUNTER — LAB (OUTPATIENT)
Dept: LAB | Facility: LAB | Age: 54
End: 2024-04-08
Payer: COMMERCIAL

## 2024-04-08 ENCOUNTER — HOSPITAL ENCOUNTER (OUTPATIENT)
Dept: RADIOLOGY | Facility: HOSPITAL | Age: 54
Discharge: HOME | End: 2024-04-08
Payer: COMMERCIAL

## 2024-04-08 DIAGNOSIS — G47.09 OTHER INSOMNIA: ICD-10-CM

## 2024-04-08 DIAGNOSIS — E78.2 MIXED HYPERLIPIDEMIA: ICD-10-CM

## 2024-04-08 DIAGNOSIS — Z00.00 WELL ADULT EXAM: ICD-10-CM

## 2024-04-08 DIAGNOSIS — Z13.6 SCREENING FOR ISCHEMIC HEART DISEASE: ICD-10-CM

## 2024-04-08 LAB
25(OH)D3 SERPL-MCNC: 44 NG/ML (ref 30–100)
FERRITIN SERPL-MCNC: 44 NG/ML (ref 8–150)
FOLATE SERPL-MCNC: >22.3 NG/ML
IRON SATN MFR SERPL: 28 % (ref 25–45)
IRON SERPL-MCNC: 105 UG/DL (ref 35–150)
PREALB SERPL-MCNC: 26.8 MG/DL (ref 18–40)
TIBC SERPL-MCNC: 380 UG/DL (ref 240–445)
UIBC SERPL-MCNC: 275 UG/DL (ref 110–370)
VIT B12 SERPL-MCNC: 258 PG/ML (ref 211–911)

## 2024-04-08 PROCEDURE — 83550 IRON BINDING TEST: CPT

## 2024-04-08 PROCEDURE — 82746 ASSAY OF FOLIC ACID SERUM: CPT

## 2024-04-08 PROCEDURE — 75571 CT HRT W/O DYE W/CA TEST: CPT

## 2024-04-08 PROCEDURE — 84630 ASSAY OF ZINC: CPT

## 2024-04-08 PROCEDURE — 82306 VITAMIN D 25 HYDROXY: CPT

## 2024-04-08 PROCEDURE — 84134 ASSAY OF PREALBUMIN: CPT

## 2024-04-08 PROCEDURE — 83540 ASSAY OF IRON: CPT

## 2024-04-08 PROCEDURE — 84255 ASSAY OF SELENIUM: CPT

## 2024-04-08 PROCEDURE — 83704 LIPOPROTEIN BLD QUAN PART: CPT

## 2024-04-08 PROCEDURE — 82728 ASSAY OF FERRITIN: CPT

## 2024-04-08 PROCEDURE — 82607 VITAMIN B-12: CPT

## 2024-04-08 PROCEDURE — 82525 ASSAY OF COPPER: CPT

## 2024-04-08 PROCEDURE — 36415 COLL VENOUS BLD VENIPUNCTURE: CPT

## 2024-04-08 PROCEDURE — 84425 ASSAY OF VITAMIN B-1: CPT

## 2024-04-10 LAB
CHOLEST SERPL-MCNC: 221 MG/DL (ref 100–199)
HDL SERPL-SCNC: 41.4 UMOL/L
HDLC SERPL-MCNC: 95 MG/DL
LDL SERPL QN: 22.3 NM
LDL SERPL-SCNC: 921 NMOL/L
LDL SMALL SERPL-SCNC: <90 NMOL/L
LDLC SERPL CALC-MCNC: 116 MG/DL (ref 0–99)
LP-IR SCORE SERPL: <25
TRIGL SERPL-MCNC: 56 MG/DL (ref 0–149)

## 2024-04-11 LAB
COPPER SERPL-MCNC: 108.7 UG/DL (ref 80–155)
SELENIUM SERPL-MCNC: 154.9 UG/L (ref 23–190)
ZINC SERPL-MCNC: 67 UG/DL (ref 60–120)

## 2024-04-12 LAB — VIT B1 PYROPHOSHATE BLD-SCNC: 154 NMOL/L (ref 70–180)

## 2024-04-15 ENCOUNTER — PATIENT MESSAGE (OUTPATIENT)
Dept: PRIMARY CARE | Facility: CLINIC | Age: 54
End: 2024-04-15
Payer: COMMERCIAL

## 2024-06-18 ENCOUNTER — APPOINTMENT (OUTPATIENT)
Dept: UROLOGY | Facility: CLINIC | Age: 54
End: 2024-06-18
Payer: COMMERCIAL

## 2024-06-18 DIAGNOSIS — N39.3 SUI (STRESS URINARY INCONTINENCE, FEMALE): Primary | ICD-10-CM

## 2024-06-18 DIAGNOSIS — N95.8 GENITOURINARY SYNDROME OF MENOPAUSE: ICD-10-CM

## 2024-06-18 PROCEDURE — 99213 OFFICE O/P EST LOW 20 MIN: CPT | Performed by: STUDENT IN AN ORGANIZED HEALTH CARE EDUCATION/TRAINING PROGRAM

## 2024-06-18 NOTE — PROGRESS NOTES
Virtual or Telephone Consent    An interactive audio and video telecommunication system which permits real time communications between the patient (at the originating site) and provider (at the distant site) was utilized to provide this telehealth service.   Verbal consent was requested and obtained from Yaron Walker on this date, 06/18/24 for a telehealth visit.            HISTORY OF PRESENT ILLNESS:  Reports she is leaking a little bit more but not quite as bothersome as it used to be in the past           Past Medical History  She has no past medical history on file.    Surgical History  She has a past surgical history that includes CT angio abdomen pelvis w and or wo IV IV contrast (09/30/2021); Other surgical history (02/09/2022); Other surgical history (10/04/2021); and Hysterectomy.     Social History  She reports that she has never smoked. She has never used smokeless tobacco. She reports current alcohol use. She reports that she does not use drugs.    Family History  Family History   Problem Relation Name Age of Onset    Celiac disease Mother      Hypertension Mother      IgA nephropathy Sister      Celiac disease Brother          Allergies  Naproxen sodium, Codeine, Meperidine, and Oxycodone      A comprehensive 10+ review of systems was negative except for: see hpi                  Assessment:  54 yo with BEL, + CST         Stress incontinence:  s/p bulking 5/19/23  Has been doing well, slightly worsening leakage  Wants to give it more time  F/up in 3 months      Vaginal dryness:  continue [employer] Klarissa Hernández MD

## 2024-09-10 ENCOUNTER — PATIENT MESSAGE (OUTPATIENT)
Dept: PRIMARY CARE | Facility: CLINIC | Age: 54
End: 2024-09-10
Payer: COMMERCIAL

## 2024-09-10 DIAGNOSIS — Z52.4 DONOR OF KIDNEY FOR TRANSPLANT: Primary | ICD-10-CM

## 2024-11-19 ENCOUNTER — LAB (OUTPATIENT)
Dept: LAB | Facility: LAB | Age: 54
End: 2024-11-19
Payer: COMMERCIAL

## 2024-11-19 ENCOUNTER — TELEPHONE (OUTPATIENT)
Dept: PRIMARY CARE | Facility: CLINIC | Age: 54
End: 2024-11-19

## 2024-11-19 ENCOUNTER — PATIENT MESSAGE (OUTPATIENT)
Dept: PRIMARY CARE | Facility: CLINIC | Age: 54
End: 2024-11-19

## 2024-11-19 DIAGNOSIS — E55.9 VITAMIN D DEFICIENCY, UNSPECIFIED: Primary | ICD-10-CM

## 2024-11-19 DIAGNOSIS — D72.819 LEUKOPENIA, UNSPECIFIED TYPE: Primary | ICD-10-CM

## 2024-11-19 DIAGNOSIS — M25.70 OSTEOPHYTE, UNSPECIFIED JOINT: ICD-10-CM

## 2024-11-19 DIAGNOSIS — Z52.4 DONOR OF KIDNEY FOR TRANSPLANT: ICD-10-CM

## 2024-11-19 LAB
25(OH)D3 SERPL-MCNC: 41 NG/ML (ref 30–100)
ALBUMIN SERPL BCP-MCNC: 4.1 G/DL (ref 3.4–5)
ALP SERPL-CCNC: 82 U/L (ref 33–110)
ALT SERPL W P-5'-P-CCNC: 17 U/L (ref 7–45)
ANION GAP SERPL CALC-SCNC: 10 MMOL/L (ref 10–20)
AST SERPL W P-5'-P-CCNC: 21 U/L (ref 9–39)
BASOPHILS # BLD AUTO: 0.02 X10*3/UL (ref 0–0.1)
BASOPHILS NFR BLD AUTO: 0.5 %
BILIRUB SERPL-MCNC: 0.6 MG/DL (ref 0–1.2)
BUN SERPL-MCNC: 15 MG/DL (ref 6–23)
CALCIUM SERPL-MCNC: 8.9 MG/DL (ref 8.6–10.3)
CHLORIDE SERPL-SCNC: 104 MMOL/L (ref 98–107)
CO2 SERPL-SCNC: 28 MMOL/L (ref 21–32)
CREAT SERPL-MCNC: 1 MG/DL (ref 0.5–1.05)
CRP SERPL-MCNC: 0.33 MG/DL
EGFRCR SERPLBLD CKD-EPI 2021: 67 ML/MIN/1.73M*2
EOSINOPHIL # BLD AUTO: 0.09 X10*3/UL (ref 0–0.7)
EOSINOPHIL NFR BLD AUTO: 2.5 %
ERYTHROCYTE [DISTWIDTH] IN BLOOD BY AUTOMATED COUNT: 12.4 % (ref 11.5–14.5)
ERYTHROCYTE [SEDIMENTATION RATE] IN BLOOD BY WESTERGREN METHOD: 7 MM/H (ref 0–30)
GLUCOSE SERPL-MCNC: 86 MG/DL (ref 74–99)
HCT VFR BLD AUTO: 43.2 % (ref 36–46)
HGB BLD-MCNC: 14.3 G/DL (ref 12–16)
IMM GRANULOCYTES # BLD AUTO: 0.01 X10*3/UL (ref 0–0.7)
IMM GRANULOCYTES NFR BLD AUTO: 0.3 % (ref 0–0.9)
LYMPHOCYTES # BLD AUTO: 1.3 X10*3/UL (ref 1.2–4.8)
LYMPHOCYTES NFR BLD AUTO: 35.5 %
MCH RBC QN AUTO: 30 PG (ref 26–34)
MCHC RBC AUTO-ENTMCNC: 33.1 G/DL (ref 32–36)
MCV RBC AUTO: 91 FL (ref 80–100)
MONOCYTES # BLD AUTO: 0.39 X10*3/UL (ref 0.1–1)
MONOCYTES NFR BLD AUTO: 10.7 %
NEUTROPHILS # BLD AUTO: 1.85 X10*3/UL (ref 1.2–7.7)
NEUTROPHILS NFR BLD AUTO: 50.5 %
NRBC BLD-RTO: 0 /100 WBCS (ref 0–0)
PLATELET # BLD AUTO: 187 X10*3/UL (ref 150–450)
POTASSIUM SERPL-SCNC: 4.1 MMOL/L (ref 3.5–5.3)
PROT SERPL-MCNC: 6.5 G/DL (ref 6.4–8.2)
RBC # BLD AUTO: 4.76 X10*6/UL (ref 4–5.2)
RHEUMATOID FACT SER NEPH-ACNC: 11 IU/ML (ref 0–15)
SODIUM SERPL-SCNC: 138 MMOL/L (ref 136–145)
WBC # BLD AUTO: 3.7 X10*3/UL (ref 4.4–11.3)

## 2024-11-19 PROCEDURE — 82306 VITAMIN D 25 HYDROXY: CPT

## 2024-11-19 PROCEDURE — 36415 COLL VENOUS BLD VENIPUNCTURE: CPT

## 2024-11-19 PROCEDURE — 86140 C-REACTIVE PROTEIN: CPT

## 2024-11-19 PROCEDURE — 80053 COMPREHEN METABOLIC PANEL: CPT

## 2024-11-19 PROCEDURE — 85652 RBC SED RATE AUTOMATED: CPT

## 2024-11-19 PROCEDURE — 85025 COMPLETE CBC W/AUTO DIFF WBC: CPT

## 2024-11-19 PROCEDURE — 81381 HLA I TYPING 1 ALLELE HR: CPT

## 2024-11-19 PROCEDURE — 86038 ANTINUCLEAR ANTIBODIES: CPT

## 2024-11-19 PROCEDURE — 86431 RHEUMATOID FACTOR QUANT: CPT

## 2024-11-19 NOTE — TELEPHONE ENCOUNTER
please let patient know her white blood cell count is low, and it has been the last few times it was checked. I recommend she see hematology. Order placed.

## 2024-11-20 LAB
ANA SER QL HEP2 SUBST: NEGATIVE
HLAB27 TYPING: NEGATIVE

## 2024-11-20 NOTE — TELEPHONE ENCOUNTER
Gave the patient the number for referral, she will call and get that scheduled.  Also patient is having , back pain (kidney pain) possible yeast infection, she is a kidney doner so she is concerned of course.  Advise her to go to urgent care/ER since Dr. Frye is out today.

## 2025-01-13 DIAGNOSIS — G47.09 OTHER INSOMNIA: ICD-10-CM

## 2025-01-13 RX ORDER — TRAZODONE HYDROCHLORIDE 50 MG/1
50 TABLET ORAL NIGHTLY PRN
Qty: 90 TABLET | Refills: 3 | OUTPATIENT
Start: 2025-01-13

## 2025-01-23 ENCOUNTER — APPOINTMENT (OUTPATIENT)
Dept: HEMATOLOGY/ONCOLOGY | Facility: HOSPITAL | Age: 55
End: 2025-01-23
Payer: COMMERCIAL

## 2025-02-15 ENCOUNTER — PATIENT MESSAGE (OUTPATIENT)
Dept: PRIMARY CARE | Facility: CLINIC | Age: 55
End: 2025-02-15
Payer: COMMERCIAL

## 2025-02-15 DIAGNOSIS — E56.9 VITAMIN DEFICIENCY: ICD-10-CM

## 2025-02-15 DIAGNOSIS — E78.2 MIXED HYPERLIPIDEMIA: ICD-10-CM

## 2025-02-15 DIAGNOSIS — G47.09 OTHER INSOMNIA: ICD-10-CM

## 2025-02-15 DIAGNOSIS — N39.3 STRESS INCONTINENCE: ICD-10-CM

## 2025-02-15 DIAGNOSIS — D72.819 LEUKOPENIA, UNSPECIFIED TYPE: Primary | ICD-10-CM

## 2025-03-07 LAB
25(OH)D3+25(OH)D2 SERPL-MCNC: 55 NG/ML (ref 30–100)
ALBUMIN SERPL-MCNC: 4.1 G/DL (ref 3.6–5.1)
ALP SERPL-CCNC: 80 U/L (ref 37–153)
ALT SERPL-CCNC: 15 U/L (ref 6–29)
ANION GAP SERPL CALCULATED.4IONS-SCNC: 8 MMOL/L (CALC) (ref 7–17)
AST SERPL-CCNC: 18 U/L (ref 10–35)
BASOPHILS # BLD AUTO: 40 CELLS/UL (ref 0–200)
BASOPHILS NFR BLD AUTO: 1 %
BILIRUB SERPL-MCNC: 0.7 MG/DL (ref 0.2–1.2)
BUN SERPL-MCNC: 16 MG/DL (ref 7–25)
CALCIUM SERPL-MCNC: 9.5 MG/DL (ref 8.6–10.4)
CHLORIDE SERPL-SCNC: 104 MMOL/L (ref 98–110)
CHOLEST SERPL-MCNC: 237 MG/DL
CHOLEST/HDLC SERPL: 2.5 (CALC)
CO2 SERPL-SCNC: 27 MMOL/L (ref 20–32)
CREAT SERPL-MCNC: 1 MG/DL (ref 0.5–1.03)
EGFRCR SERPLBLD CKD-EPI 2021: 66 ML/MIN/1.73M2
EOSINOPHIL # BLD AUTO: 92 CELLS/UL (ref 15–500)
EOSINOPHIL NFR BLD AUTO: 2.3 %
ERYTHROCYTE [DISTWIDTH] IN BLOOD BY AUTOMATED COUNT: 12.6 % (ref 11–15)
GLUCOSE SERPL-MCNC: 84 MG/DL (ref 65–99)
HCT VFR BLD AUTO: 44.4 % (ref 35–45)
HDLC SERPL-MCNC: 96 MG/DL
HGB BLD-MCNC: 14.5 G/DL (ref 11.7–15.5)
LDLC SERPL CALC-MCNC: 126 MG/DL (CALC)
LYMPHOCYTES # BLD AUTO: 1224 CELLS/UL (ref 850–3900)
LYMPHOCYTES NFR BLD AUTO: 30.6 %
MCH RBC QN AUTO: 30 PG (ref 27–33)
MCHC RBC AUTO-ENTMCNC: 32.7 G/DL (ref 32–36)
MCV RBC AUTO: 91.9 FL (ref 80–100)
MONOCYTES # BLD AUTO: 364 CELLS/UL (ref 200–950)
MONOCYTES NFR BLD AUTO: 9.1 %
NEUTROPHILS # BLD AUTO: 2280 CELLS/UL (ref 1500–7800)
NEUTROPHILS NFR BLD AUTO: 57 %
NONHDLC SERPL-MCNC: 141 MG/DL (CALC)
PLATELET # BLD AUTO: 193 THOUSAND/UL (ref 140–400)
PMV BLD REES-ECKER: 12.5 FL (ref 7.5–12.5)
POTASSIUM SERPL-SCNC: 4.3 MMOL/L (ref 3.5–5.3)
PROT SERPL-MCNC: 6.5 G/DL (ref 6.1–8.1)
RBC # BLD AUTO: 4.83 MILLION/UL (ref 3.8–5.1)
SODIUM SERPL-SCNC: 139 MMOL/L (ref 135–146)
TRIGL SERPL-MCNC: 63 MG/DL
TSH SERPL-ACNC: 0.96 MIU/L (ref 0.4–4.5)
VIT B12 SERPL-MCNC: 529 PG/ML (ref 200–1100)
WBC # BLD AUTO: 4 THOUSAND/UL (ref 3.8–10.8)

## 2025-03-11 ENCOUNTER — APPOINTMENT (OUTPATIENT)
Dept: PRIMARY CARE | Facility: CLINIC | Age: 55
End: 2025-03-11
Payer: COMMERCIAL

## 2025-03-11 VITALS
SYSTOLIC BLOOD PRESSURE: 104 MMHG | BODY MASS INDEX: 20.92 KG/M2 | OXYGEN SATURATION: 97 % | HEART RATE: 60 BPM | HEIGHT: 68 IN | RESPIRATION RATE: 16 BRPM | WEIGHT: 138 LBS | DIASTOLIC BLOOD PRESSURE: 71 MMHG

## 2025-03-11 DIAGNOSIS — E78.2 MIXED HYPERLIPIDEMIA: ICD-10-CM

## 2025-03-11 DIAGNOSIS — Z78.0 MENOPAUSE: ICD-10-CM

## 2025-03-11 DIAGNOSIS — Z12.11 COLON CANCER SCREENING: ICD-10-CM

## 2025-03-11 DIAGNOSIS — Z12.31 VISIT FOR SCREENING MAMMOGRAM: ICD-10-CM

## 2025-03-11 DIAGNOSIS — D72.819 LEUKOPENIA, UNSPECIFIED TYPE: ICD-10-CM

## 2025-03-11 DIAGNOSIS — Z23 ENCOUNTER FOR IMMUNIZATION: ICD-10-CM

## 2025-03-11 DIAGNOSIS — G47.09 OTHER INSOMNIA: ICD-10-CM

## 2025-03-11 DIAGNOSIS — Z00.00 WELL ADULT EXAM: Primary | ICD-10-CM

## 2025-03-11 DIAGNOSIS — Z52.4 DONOR OF KIDNEY FOR TRANSPLANT: ICD-10-CM

## 2025-03-11 PROBLEM — R94.31 ABNORMAL EKG: Status: RESOLVED | Noted: 2023-01-30 | Resolved: 2025-03-11

## 2025-03-11 PROBLEM — R92.8 ABNORMAL MAMMOGRAM OF RIGHT BREAST: Status: RESOLVED | Noted: 2023-03-09 | Resolved: 2025-03-11

## 2025-03-11 PROBLEM — R32 URINARY INCONTINENCE: Status: RESOLVED | Noted: 2024-07-19 | Resolved: 2025-03-11

## 2025-03-11 PROBLEM — Z90.710 S/P HYSTERECTOMY: Status: ACTIVE | Noted: 2025-03-11

## 2025-03-11 PROBLEM — M72.2 PLANTAR FASCIITIS: Status: ACTIVE | Noted: 2022-04-05

## 2025-03-11 PROCEDURE — 3008F BODY MASS INDEX DOCD: CPT | Performed by: FAMILY MEDICINE

## 2025-03-11 PROCEDURE — 99396 PREV VISIT EST AGE 40-64: CPT | Performed by: FAMILY MEDICINE

## 2025-03-11 PROCEDURE — 1036F TOBACCO NON-USER: CPT | Performed by: FAMILY MEDICINE

## 2025-03-11 PROCEDURE — 99213 OFFICE O/P EST LOW 20 MIN: CPT | Performed by: FAMILY MEDICINE

## 2025-03-11 RX ORDER — TRAZODONE HYDROCHLORIDE 50 MG/1
50 TABLET ORAL NIGHTLY PRN
Qty: 90 TABLET | Refills: 3 | Status: SHIPPED | OUTPATIENT
Start: 2025-03-11

## 2025-03-11 RX ORDER — ESTRADIOL 0.03 MG/D
1 PATCH TRANSDERMAL WEEKLY
COMMUNITY
Start: 2025-03-11

## 2025-03-11 ASSESSMENT — ENCOUNTER SYMPTOMS
SORE THROAT: 0
DYSURIA: 0
HEMATURIA: 0
DIAPHORESIS: 0
COUGH: 0
SINUS PRESSURE: 0
NERVOUS/ANXIOUS: 0
CHILLS: 0
UNEXPECTED WEIGHT CHANGE: 0
NUMBNESS: 0
CHEST TIGHTNESS: 0
ADENOPATHY: 0
DYSPHORIC MOOD: 0
SINUS PAIN: 0
SHORTNESS OF BREATH: 0
HEADACHES: 0
POLYDIPSIA: 0
CONSTIPATION: 0
NAUSEA: 0
WHEEZING: 0
CONFUSION: 0
LIGHT-HEADEDNESS: 0
FEVER: 0
VOMITING: 0
FREQUENCY: 0
DIARRHEA: 0
PALPITATIONS: 0
ABDOMINAL PAIN: 0
DIZZINESS: 0
POLYPHAGIA: 0

## 2025-03-11 ASSESSMENT — PATIENT HEALTH QUESTIONNAIRE - PHQ9
2. FEELING DOWN, DEPRESSED OR HOPELESS: NOT AT ALL
1. LITTLE INTEREST OR PLEASURE IN DOING THINGS: NOT AT ALL
SUM OF ALL RESPONSES TO PHQ9 QUESTIONS 1 & 2: 0

## 2025-03-11 NOTE — PATIENT INSTRUCTIONS
Yaron Walker ,    Thank you for coming in today. We at [facility] appreciate your trust in our care. If you have any questions or concerns about the care you received today, please do not hesitate to contact us at [phone].    The following instructions were discussed today:    - get blood work to check kidney function and blood count in 6 months (around August 2025). No need to fast for this  - Follow up in 1 year for a physical and fasting blood work.   - I recommend the following vaccines at the pharmacy: Shingrix, Tdap

## 2025-03-11 NOTE — PROGRESS NOTES
Subjective   Patient ID: Yaron Walker is a 55 y.o. female who presents for Annual Exam (Labs).    Well Adult Physical   Patient here for a comprehensive physical exam. Also here for routine follow up. chronic issues as per assessment and plan.     Yaron reports her health as Excellent.    Does the patient take any herbs or supplements that were not prescribed by a doctor? yes   Is the patiet taking calcium supplements? yes   Is the patient taking aspirin daily? no    Reviewed 3/6/25 labs. Results as below.     Outpatient Medications Prior to Visit   Medication Sig Dispense Refill   • clobetasol (Temovate) 0.05 % external solution APPLY TOPICALLY TO SCALP EVERY DAY AT BEDTIME.     • L. acidophilus/Bifid. animalis 15.5 billion cell capsule Take by mouth.     • MAGNESIUM GLYCINATE ORAL Take by mouth.     • estradiol (Vagifem) 10 mcg tablet vaginal tablet Insert 1 tablet (10 mcg) into the vagina 2 times a week.     • fluconazole (Diflucan) 150 mg tablet TAKE 1 TABLET BY MOUTH TODAY  REPEAT IN 3 DAYS     • methylPREDNISolone (Medrol Dospak) 4 mg tablets Take by mouth as directed following package instructions     • traZODone (Desyrel) 50 mg tablet TAKE 1 TABLET AT BEDTIME AS NEEDED FOR SLEEP 90 tablet 3   • estradiol (Climara) 0.025 mg/24 hr patch Place 1 patch over 7 days on the skin 1 (one) time per week.       No facility-administered medications prior to visit.       Social History     Socioeconomic History   • Marital status:      Spouse name: Rajendra   • Number of children: 2   Occupational History   • Occupation:    Tobacco Use   • Smoking status: Never   • Smokeless tobacco: Never   Vaping Use   • Vaping status: Never Used   Substance and Sexual Activity   • Alcohol use: Yes     Comment: OCCASIONALLY   • Drug use: Never   • Sexual activity: Yes     Partners: Male     Birth control/protection: Post-menopausal     Social Drivers of Health     Intimate Partner Violence: Unknown (12/13/2023)  "   Humiliation, Afraid, Rape, and Kick questionnaire    • Fear of Current or Ex-Partner: No        History:  LMP: 2016 (hysterectomy)  Menopause at 46 years  Last pap date: 2016  Abnormal pap? no  : 3  Para: 2    E-cigarette/Vaping       Questions Responses    E-cigarette/Vaping Use Never User            Last Dental Exam: 6 months or less    Last Eye Exam: 6 months or less    Diet: well balanced    Exercise: frequently    Health Maintenance Due   Topic Date Due   • MMR Vaccines (1 of 1 - Standard series) Never done   • Cervical Cancer Screening  Never done   • DTaP/Tdap/Td Vaccines (1 - Tdap) 2001   • Pneumococcal Vaccine (1 of 1 - PCV) Never done   • Zoster Vaccines (1 of 2) Never done   • Influenza Vaccine (1) 2024   • COVID-19 Vaccine (4 - - season) 2024            Review of Systems   Constitutional:  Negative for chills, diaphoresis, fever and unexpected weight change.   HENT:  Negative for congestion, sinus pressure, sinus pain, sneezing and sore throat.    Respiratory:  Negative for cough, chest tightness, shortness of breath and wheezing.    Cardiovascular:  Negative for chest pain, palpitations and leg swelling.   Gastrointestinal:  Negative for abdominal pain, constipation, diarrhea, nausea and vomiting.   Endocrine: Negative for cold intolerance, heat intolerance, polydipsia, polyphagia and polyuria.   Genitourinary:  Negative for dysuria, frequency, hematuria and urgency.   Neurological:  Negative for dizziness, syncope, light-headedness, numbness and headaches.   Hematological:  Negative for adenopathy.   Psychiatric/Behavioral:  Negative for confusion and dysphoric mood. The patient is not nervous/anxious.          Objective   /71 (BP Location: Right arm, Patient Position: Sitting, BP Cuff Size: Adult)   Pulse 60   Resp 16   Ht 1.727 m (5' 8\")   Wt 62.6 kg (138 lb)   SpO2 97%   BMI 20.98 kg/m²     Physical Exam  Vitals and nursing note reviewed. "   Constitutional:       General: She is not in acute distress.     Appearance: Normal appearance.   HENT:      Head: Normocephalic and atraumatic.      Nose: Nose normal.   Eyes:      Extraocular Movements: Extraocular movements intact.      Conjunctiva/sclera: Conjunctivae normal.      Pupils: Pupils are equal, round, and reactive to light.   Cardiovascular:      Rate and Rhythm: Normal rate and regular rhythm.      Heart sounds: No murmur heard.     No friction rub. No gallop.   Pulmonary:      Effort: Pulmonary effort is normal.      Breath sounds: Normal breath sounds. No wheezing, rhonchi or rales.   Abdominal:      General: Bowel sounds are normal. There is no distension.      Palpations: Abdomen is soft.      Tenderness: There is no abdominal tenderness.   Musculoskeletal:         General: Normal range of motion.      Cervical back: Normal range of motion and neck supple.   Skin:     General: Skin is warm and dry.   Neurological:      General: No focal deficit present.      Mental Status: She is alert and oriented to person, place, and time.      Deep Tendon Reflexes: Reflexes normal.   Psychiatric:         Mood and Affect: Mood normal.         Behavior: Behavior normal.         Thought Content: Thought content normal.         Judgment: Judgment normal.       No visits with results within 2 Week(s) from this visit.   Latest known visit with results is:   Patient Message on 02/15/2025   Component Date Value Ref Range Status   • VITAMIN B12 03/06/2025 529  200 - 1,100 pg/mL Final   • VITAMIN D,25-OH,TOTAL,IA 03/06/2025 55  30 - 100 ng/mL Final    Comment: Vitamin D Status         25-OH Vitamin D:     Deficiency:                    <20 ng/mL  Insufficiency:             20 - 29 ng/mL  Optimal:                 > or = 30 ng/mL     For 25-OH Vitamin D testing on patients on   D2-supplementation and patients for whom quantitation   of D2 and D3 fractions is required, the QuestAssureD(TM)  25-OH VIT D, (D2,D3),  LC/MS/MS is recommended: order   code 59516 (patients >2yrs).     See Note 1     Note 1     For additional information, please refer to   http://[employer]/faq/BTT632   (This link is being provided for informational/  educational purposes only.)     • WHITE BLOOD CELL COUNT 03/06/2025 4.0  3.8 - 10.8 Thousand/uL Final   • RED BLOOD CELL COUNT 03/06/2025 4.83  3.80 - 5.10 Million/uL Final   • HEMOGLOBIN 03/06/2025 14.5  11.7 - 15.5 g/dL Final   • HEMATOCRIT 03/06/2025 44.4  35.0 - 45.0 % Final   • MCV 03/06/2025 91.9  80.0 - 100.0 fL Final   • MCH 03/06/2025 30.0  27.0 - 33.0 pg Final   • MCHC 03/06/2025 32.7  32.0 - 36.0 g/dL Final    Comment: For adults, a slight decrease in the calculated MCHC  value (in the range of 30 to 32 g/dL) is most likely  not clinically significant; however, it should be  interpreted with caution in correlation with other  red cell parameters and the patient's clinical  condition.     • RDW 03/06/2025 12.6  11.0 - 15.0 % Final   • PLATELET COUNT 03/06/2025 193  140 - 400 Thousand/uL Final   • MPV 03/06/2025 12.5  7.5 - 12.5 fL Final   • ABSOLUTE NEUTROPHILS 03/06/2025 2,280  1,500 - 7,800 cells/uL Final   • ABSOLUTE LYMPHOCYTES 03/06/2025 1,224  850 - 3,900 cells/uL Final   • ABSOLUTE MONOCYTES 03/06/2025 364  200 - 950 cells/uL Final   • ABSOLUTE EOSINOPHILS 03/06/2025 92  15 - 500 cells/uL Final   • ABSOLUTE BASOPHILS 03/06/2025 40  0 - 200 cells/uL Final   • NEUTROPHILS 03/06/2025 57  % Final   • LYMPHOCYTES 03/06/2025 30.6  % Final   • MONOCYTES 03/06/2025 9.1  % Final   • EOSINOPHILS 03/06/2025 2.3  % Final   • BASOPHILS 03/06/2025 1.0  % Final   • GLUCOSE 03/06/2025 84  65 - 99 mg/dL Final    Comment:               Fasting reference interval        • UREA NITROGEN (BUN) 03/06/2025 16  7 - 25 mg/dL Final   • CREATININE 03/06/2025 1.00  0.50 - 1.03 mg/dL Final   • EGFR 03/06/2025 66  > OR = 60 mL/min/1.73m2 Final   • SODIUM 03/06/2025 139  135 - 146 mmol/L Final    • POTASSIUM 03/06/2025 4.3  3.5 - 5.3 mmol/L Final   • CHLORIDE 03/06/2025 104  98 - 110 mmol/L Final   • CARBON DIOXIDE 03/06/2025 27  20 - 32 mmol/L Final   • ELECTROLYTE BALANCE 03/06/2025 8  7 - 17 mmol/L (calc) Final   • CALCIUM 03/06/2025 9.5  8.6 - 10.4 mg/dL Final   • PROTEIN, TOTAL 03/06/2025 6.5  6.1 - 8.1 g/dL Final   • ALBUMIN 03/06/2025 4.1  3.6 - 5.1 g/dL Final   • BILIRUBIN, TOTAL 03/06/2025 0.7  0.2 - 1.2 mg/dL Final   • ALKALINE PHOSPHATASE 03/06/2025 80  37 - 153 U/L Final   • AST 03/06/2025 18  10 - 35 U/L Final   • ALT 03/06/2025 15  6 - 29 U/L Final   • CHOLESTEROL, TOTAL 03/06/2025 237 (H)  <200 mg/dL Final   • HDL CHOLESTEROL 03/06/2025 96  > OR = 50 mg/dL Final   • TRIGLYCERIDES 03/06/2025 63  <150 mg/dL Final   • LDL-CHOLESTEROL 03/06/2025 126 (H)  mg/dL (calc) Final    Comment: Reference range: <100     Desirable range <100 mg/dL for primary prevention;    <70 mg/dL for patients with CHD or diabetic patients   with > or = 2 CHD risk factors.     LDL-C is now calculated using the Toribio-Gonsalez   calculation, which is a validated novel method providing   better accuracy than the Friedewald equation in the   estimation of LDL-C.   Toribio WHITAKER et al. FIOR. 2013;310(19): 9690-0584   (http://education.[employer]/faq/WTA667)     • CHOL/HDLC RATIO 03/06/2025 2.5  <5.0 (calc) Final   • NON HDL CHOLESTEROL 03/06/2025 141 (H)  <130 mg/dL (calc) Final    Comment: For patients with diabetes plus 1 major ASCVD risk   factor, treating to a non-HDL-C goal of <100 mg/dL   (LDL-C of <70 mg/dL) is considered a therapeutic   option.     • TSH W/REFLEX TO FT4 03/06/2025 0.96  0.40 - 4.50 mIU/L Final        Assessment/Plan   Problem List Items Addressed This Visit             ICD-10-CM    Colon cancer screening Z12.11     - negative cologuard 1/2024; repeat 1/2027         Donor of kidney for transplant Z52.4    Relevant Orders    Basic metabolic panel    Comprehensive Metabolic Panel    CBC and Auto  Differential    Vitamin D 25-Hydroxy,Total (for eval of Vitamin D levels)    Vitamin B12    Encounter for immunization Z23     - recommended the following vaccines at the pharmacy: Tdap, Shingrix, COVID-19         Leukopenia D72.819     - negative work up with hematology 11/2024  - continue to monitor          Relevant Orders    CBC and Auto Differential    Comprehensive Metabolic Panel    CBC and Auto Differential    Vitamin D 25-Hydroxy,Total (for eval of Vitamin D levels)    Vitamin B12    Menopause Z78.0    Relevant Medications    estradiol (Climara) 0.025 mg/24 hr patch    Other Relevant Orders    Comprehensive Metabolic Panel    CBC and Auto Differential    Vitamin D 25-Hydroxy,Total (for eval of Vitamin D levels)    Vitamin B12    Mixed hyperlipidemia E78.2     -  (123) but HDL 96 (111)  - 10 year CV risk 1%  - CT cardiac score of zero in 4/2024           Relevant Orders    Comprehensive Metabolic Panel    Lipid Panel    TSH with reflex to Free T4 if abnormal    CBC and Auto Differential    Vitamin D 25-Hydroxy,Total (for eval of Vitamin D levels)    Vitamin B12    Other insomnia G47.09     - controlled. Continue trazodone as needed          Relevant Medications    traZODone (Desyrel) 50 mg tablet    Other Relevant Orders    Comprehensive Metabolic Panel    TSH with reflex to Free T4 if abnormal    CBC and Auto Differential    Vitamin B12    Visit for screening mammogram Z12.31     - normal mammogram 9/2024          Other Visit Diagnoses         Codes    Well adult exam    -  Primary Z00.00    Relevant Orders    Vitamin D 25-Hydroxy,Total (for eval of Vitamin D levels)    Vitamin B12

## 2025-03-17 ENCOUNTER — HOSPITAL ENCOUNTER (OUTPATIENT)
Dept: RADIOLOGY | Facility: HOSPITAL | Age: 55
Discharge: HOME | End: 2025-03-17
Payer: COMMERCIAL

## 2025-03-17 ENCOUNTER — HOSPITAL ENCOUNTER (OUTPATIENT)
Dept: RADIOLOGY | Facility: EXTERNAL LOCATION | Age: 55
Discharge: HOME | End: 2025-03-17

## 2025-03-17 DIAGNOSIS — Z15.01 GENETIC SUSCEPTIBILITY TO MALIGNANT NEOPLASM OF BREAST: ICD-10-CM

## 2025-03-17 DIAGNOSIS — Z15.89 GENETIC SUSCEPTIBILITY TO OTHER DISEASE: ICD-10-CM

## 2025-03-17 DIAGNOSIS — Z91.89 OTHER SPECIFIED PERSONAL RISK FACTORS, NOT ELSEWHERE CLASSIFIED: ICD-10-CM

## 2025-03-17 DIAGNOSIS — R92.30 DENSE BREASTS, UNSPECIFIED: ICD-10-CM

## 2025-03-17 PROCEDURE — 2550000001 HC RX 255 CONTRASTS

## 2025-03-17 PROCEDURE — A9575 INJ GADOTERATE MEGLUMI 0.1ML: HCPCS

## 2025-03-17 PROCEDURE — 77049 MRI BREAST C-+ W/CAD BI: CPT

## 2025-03-17 RX ORDER — GADOTERATE MEGLUMINE 376.9 MG/ML
0.2 INJECTION INTRAVENOUS
Status: COMPLETED | OUTPATIENT
Start: 2025-03-17 | End: 2025-03-17

## 2025-03-17 RX ADMIN — GADOTERATE MEGLUMINE 12.5 ML: 376.9 INJECTION INTRAVENOUS at 09:49

## 2025-08-11 DIAGNOSIS — D72.819 LEUKOPENIA, UNSPECIFIED TYPE: ICD-10-CM

## 2025-08-11 DIAGNOSIS — Z52.4 DONOR OF KIDNEY FOR TRANSPLANT: ICD-10-CM

## 2026-03-16 ENCOUNTER — APPOINTMENT (OUTPATIENT)
Dept: PRIMARY CARE | Facility: CLINIC | Age: 56
End: 2026-03-16
Payer: COMMERCIAL